# Patient Record
Sex: MALE | Race: BLACK OR AFRICAN AMERICAN | Employment: UNEMPLOYED | URBAN - METROPOLITAN AREA
[De-identification: names, ages, dates, MRNs, and addresses within clinical notes are randomized per-mention and may not be internally consistent; named-entity substitution may affect disease eponyms.]

---

## 2022-12-09 ENCOUNTER — HOSPITAL ENCOUNTER (EMERGENCY)
Age: 22
Discharge: HOME | End: 2022-12-10
Attending: EMERGENCY MEDICINE
Payer: COMMERCIAL

## 2022-12-09 VITALS
BODY MASS INDEX: 21.2 KG/M2 | HEART RATE: 62 BPM | TEMPERATURE: 97.8 F | DIASTOLIC BLOOD PRESSURE: 77 MMHG | RESPIRATION RATE: 16 BRPM | SYSTOLIC BLOOD PRESSURE: 130 MMHG | OXYGEN SATURATION: 100 % | HEIGHT: 73 IN | WEIGHT: 160 LBS
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
## 2022-12-09 DIAGNOSIS — S09.90XA CLOSED HEAD INJURY, INITIAL ENCOUNTER: Primary | ICD-10-CM

## 2022-12-09 PROCEDURE — 99284 EMERGENCY DEPT VISIT MOD MDM: CPT

## 2022-12-09 NOTE — Clinical Note
Jennifer Cordero was seen and treated in our emergency department on 12/9/2022. He may return to work on 12/12/2022. If you have any questions or concerns, please don't hesitate to call.       Radha Rodriguez MD

## 2022-12-10 ENCOUNTER — APPOINTMENT (OUTPATIENT)
Dept: CT IMAGING | Age: 22
End: 2022-12-10
Payer: COMMERCIAL

## 2022-12-10 ENCOUNTER — APPOINTMENT (OUTPATIENT)
Dept: GENERAL RADIOLOGY | Age: 22
End: 2022-12-10
Payer: COMMERCIAL

## 2022-12-10 PROCEDURE — 70450 CT HEAD/BRAIN W/O DYE: CPT

## 2022-12-10 PROCEDURE — 73130 X-RAY EXAM OF HAND: CPT

## 2022-12-10 RX ORDER — ACETAMINOPHEN 325 MG/1
650 TABLET ORAL ONCE
Status: DISCONTINUED | OUTPATIENT
Start: 2022-12-10 | End: 2022-12-10 | Stop reason: HOSPADM

## 2022-12-10 ASSESSMENT — ENCOUNTER SYMPTOMS
COLOR CHANGE: 0
APNEA: 0
CHEST TIGHTNESS: 0
SHORTNESS OF BREATH: 0
DIARRHEA: 0
SINUS PAIN: 0
EYES NEGATIVE: 1
VOMITING: 0
ABDOMINAL DISTENTION: 0
CONSTIPATION: 0

## 2022-12-10 NOTE — DISCHARGE INSTRUCTIONS
I recommend avoiding bright lights and screens for the next  2 days. Try to get plenty of rest.  Tylenol and Motrin can be used for pain.
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
## 2023-05-03 ENCOUNTER — HOSPITAL ENCOUNTER (EMERGENCY)
Age: 23
Discharge: HOME | End: 2023-05-04
Attending: EMERGENCY MEDICINE

## 2023-05-03 ENCOUNTER — APPOINTMENT (OUTPATIENT)
Dept: GENERAL RADIOLOGY | Age: 23
End: 2023-05-03

## 2023-05-03 VITALS
RESPIRATION RATE: 18 BRPM | HEIGHT: 73 IN | DIASTOLIC BLOOD PRESSURE: 49 MMHG | OXYGEN SATURATION: 99 % | HEART RATE: 69 BPM | WEIGHT: 150 LBS | SYSTOLIC BLOOD PRESSURE: 138 MMHG | TEMPERATURE: 98.1 F | BODY MASS INDEX: 19.88 KG/M2

## 2023-05-03 DIAGNOSIS — R07.9 CHEST PAIN, UNSPECIFIED TYPE: Primary | ICD-10-CM

## 2023-05-03 LAB
D DIMER BLD IA.RAPID-MCNC: <0.27 UG/ML FEU (ref 0–0.57)
TROPONIN I SERPL DL<=0.01 NG/ML-MCNC: 7 NG/L (ref 0–22)
TROPONIN I SERPL DL<=0.01 NG/ML-MCNC: 8 NG/L (ref 0–22)

## 2023-05-03 PROCEDURE — 71046 X-RAY EXAM CHEST 2 VIEWS: CPT

## 2023-05-03 PROCEDURE — 99284 EMERGENCY DEPT VISIT MOD MDM: CPT

## 2023-05-03 PROCEDURE — 85379 FIBRIN DEGRADATION QUANT: CPT

## 2023-05-03 PROCEDURE — 84484 ASSAY OF TROPONIN QUANT: CPT

## 2023-05-03 PROCEDURE — 93005 ELECTROCARDIOGRAM TRACING: CPT | Performed by: STUDENT IN AN ORGANIZED HEALTH CARE EDUCATION/TRAINING PROGRAM

## 2023-05-03 RX ORDER — IBUPROFEN 600 MG/1
600 TABLET ORAL EVERY 8 HOURS PRN
Qty: 20 TABLET | Refills: 0 | Status: SHIPPED | OUTPATIENT
Start: 2023-05-03

## 2023-05-03 ASSESSMENT — PAIN DESCRIPTION - LOCATION: LOCATION: CHEST;BACK

## 2023-05-03 ASSESSMENT — PAIN - FUNCTIONAL ASSESSMENT: PAIN_FUNCTIONAL_ASSESSMENT: 0-10

## 2023-05-03 ASSESSMENT — PAIN SCALES - GENERAL: PAINLEVEL_OUTOF10: 8

## 2023-05-04 NOTE — PROGRESS NOTES
SPIRITUAL CARE DEPARTMENT - [facility]  PROGRESS NOTE    Shift date: 05/03/23  Shift day: Wednesday   Shift # 2    Room # 21/21   Name: Lizbeth Villa                Sikhism:    Place of Anabaptism:     Referral: Routine Visit    Admit Date & Time: 5/3/2023  7:28 PM    Assessment:    Intervention:  Writer introduced self and title as . Patient did not appear to mind  presence and engaged in conversation. Patient discussed his health leading up to his hospital visit. Patient appeared calm, coping, and hopeful when conversing with . Patient's sister was present in room for additional support.  provided a supportive presence through active listening and words of affirmation. Outcome:  Patient appeared receptive to  visit. Plan:  Chaplains will remain available to offer spiritual and emotional support as needed.       Electronically signed by Alfreda Sears on 5/3/2023 at [address] [place] [address]  [phone]
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
## 2023-05-04 NOTE — ED NOTES
Discharge instructions given. Verbalized understanding. All questions answered.        Ti Rudolph RN  05/04/23 0021

## 2023-05-04 NOTE — DISCHARGE INSTRUCTIONS
You were evaluated for chest pain. We obtained lab work, ekg and chest xray which shows no abnormalities. Because we did not find any emergent cause for the pain that does not mean you do not have some underlying issue going on. This can be evaluated further by your primary care physician. Take Motrin for pain as needed. Return to the emergency department develop severe worsening chest pain associate with nausea vomiting, abdominal pain, fevers or chills, feels appear to pass out, palpitations, or any other symptom of concern.
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
## 2023-05-04 NOTE — ED PROVIDER NOTES
[address] HANDOFF       Handoff taken on the following patient from prior Attending Physician:  Pt Name: [facility]  PCP:  No primary care provider on file. Attestation  I was available and discussed any additional care issues that arose and coordinated the management plans with the resident(s) caring for the patient during my duty period. Any areas of disagreement with resident's documentation of care or procedures are noted on the chart. I was personally present for the key portions of any/all procedures during my duty period. I have documented in the chart those procedures where I was not present during the key portions. CHIEF COMPLAINT       Chief Complaint   Patient presents with    Chest Pain     Since December     Back Pain         CURRENT MEDICATIONS     Previous Medications  Previous Medications    No medications on file       Encounter Medications  No orders of the defined types were placed in this encounter. ALLERGIES     has No Known Allergies. RECENT VITALS:   Temp: 98.1 °F (36.7 °C),  Pulse: 69, Respirations: 18, BP: (!) 138/49    RADIOLOGY:   XR CHEST (2 VW)   Final Result   No radiographic evidence of an acute cardiopulmonary process. LABS:  Labs Reviewed   TROPONIN   D-DIMER, QUANTITATIVE   TROPONIN           PLAN/ TASKS OUTSTANDING     This patient is a 23 y.o. Male.     Patient has chest pain with some hemoptysis, lung sounds clear, getting respiratory work-up, if reassuring will likely discharge with supportive care    (Please note that portions of this note were completed with a voice recognition program.  Efforts were made to edit the dictations but occasionally words are mis-transcribed.)    Jenae Powers MD,, MD  Attending Emergency Physician        Jenae Powers MD  05/03/23 9858

## 2023-05-06 LAB
EKG ATRIAL RATE: 66 BPM
EKG P AXIS: 41 DEGREES
EKG P-R INTERVAL: 172 MS
EKG Q-T INTERVAL: 382 MS
EKG QRS DURATION: 102 MS
EKG QTC CALCULATION (BAZETT): 400 MS
EKG R AXIS: 86 DEGREES
EKG T AXIS: 50 DEGREES
EKG VENTRICULAR RATE: 66 BPM

## 2023-05-06 PROCEDURE — 93010 ELECTROCARDIOGRAM REPORT: CPT | Performed by: INTERNAL MEDICINE
